# Patient Record
(demographics unavailable — no encounter records)

---

## 2024-12-17 NOTE — HISTORY OF PRESENT ILLNESS
[FreeTextEntry1] : 24-year-old female presents here for travel medicine evaluation.  Patient be traveling to [place] for a wedding.  She has been previously vaccinated for hepatitis A, hepatitis B, yellow fever, MMR, polio. She has no medical problems.  She takes no medications.  She is not feeling ill.

## 2024-12-17 NOTE — PHYSICAL EXAM
[General Appearance - Alert] : alert [General Appearance - In No Acute Distress] : in no acute distress [General Appearance - Well Nourished] : well nourished [General Appearance - Well-Appearing] : healthy appearing [Motor Exam] : the motor exam was normal [No Focal Deficits] : no focal deficits [Oriented To Time, Place, And Person] : oriented to person, place, and time [Affect] : the affect was normal

## 2024-12-17 NOTE — ASSESSMENT
[FreeTextEntry1] : 24-year-old female here for trauma medicine evaluation  Counseling for travel Need for travel vaccinations Need for malaria prophylaxis  Recommendations: Patient educated about mosquitoes and how to avoid mosquitoes.  Recommended mosquito repellent. Educated patient on how to use mosquito repellent. Advised patient to avoid tap water and to only use bottled water. Avoid fruits washed with tap water. Avoid ice which is usually made from tap water. Patient educated about traveler's diarrhea.  Will prescribe azithromycin 500 mg and patient should take 1 tab daily for 3 days if should have traveler's diarrhea. Patient educated about malaria. We will prescribe patient Malarone to take 1 tab 2 days prior to travel and daily during the trip and for 7 days after returning from her trip. Patient given the option for oral typhoid vaccination versus IM typhoid vaccine.  Explained differences between the 2 different vaccines. Patient would like to oral typhoid vaccination.  Will send the prescription. Patient advised to avoid cosmetic or elective surgical procedures while on her trip.   Patient was advised to use protection during sexual intercourse while on her trip  Follow-up as needed.  Counseling included lab results, differential diagnosis, treatment options, risks and benefits, lifestyle changes, current condition, medications and dose adjustments. The patient was interactive attentive and asked questions and verbalized understanding.